# Patient Record
(demographics unavailable — no encounter records)

---

## 2024-11-01 NOTE — HISTORY OF PRESENT ILLNESS
[FreeTextEntry1] : Heather is here for a f/u visit. Moved to [place] over the summer.  For several months had some increased burning with intercourse but not as severe as it previously had been, still tolerable.  She has been using topical AGL 2/5/2% ftp to introitus BID and B20 suppositories PV qhs.  Started to have a flare 2 weeks ago during and after intercourse.  Reports increased constant burning.  She stopped taking montelukast sometime in the last 1-2 months. She had been going to PFPT in [facility] which had been greatly helping mitigate her PFM spasms and deep dyspareunia. She had to stop when she moved, but found another PFPT near her in [place] and is planning on restarting.  Denies symptoms of BV. Has her menses today. States she still wears a pantyliner daily b/c she thinks she has too much vaginal discharge.

## 2024-11-01 NOTE — DISCUSSION/SUMMARY
[FreeTextEntry1] : Lengthy discussion re: chronicity of condition and related flares.   Increase topical cream to AGL 4/6//2% in lipoderm ftp to introitus BID.  Increase B20 suppositories PV to BID x 2 months then decrease back to QHS. Restart PFPT in [place]. Restart singulair 10mg po QD.   We discussed the effects of altered pH on the vaginal microbiome and risk for vaginal infections. I suggested that she try GCL Flourish suppositories which contain L. crispatus and use 1 suppository PV 3x/week along with GCL Balance wash. I explained that the Flourish suppositories and Balance wash are not FDA approved, however, she can go on their website and read literature from WHO and NIH regarding it's use in helping to maintain healthy vaginal ecosystem.  t/c dilation therapy  Referred to Yeimi Renteria, PhD in [place] for f/u care as [place] is [place] far for her to travel from [place].      Fluconazole Counseling:  Patient counseled regarding adverse effects of fluconazole including but not limited to headache, diarrhea, nausea, upset stomach, liver function test abnormalities, taste disturbance, and stomach pain.  There is a rare possibility of liver failure that can occur when taking fluconazole.  The patient understands that monitoring of LFTs and kidney function test may be required, especially at baseline. The patient verbalized understanding of the proper use and possible adverse effects of fluconazole.  All of the patient's questions and concerns were addressed.

## 2024-11-01 NOTE — PHYSICAL EXAM
[No Acute Distress] : in no acute distress [Well developed] : well developed [Well Nourished] : ~L well nourished [Good Hygeine] : demonstrates good hygeine [Oriented x3] : oriented to person, place, and time [Normal Memory] : ~T memory was ~L unimpaired [Normal Mood/Affect] : mood and affect are normal [Respirations regular] : ~T respiratory rate was regular [No Edema] : ~T edema was not present [Warm and Dry] : was warm and dry to touch [Normal Gait] : gait was normal [No Lesions] : no lesions were seen on the external genitalia [Labia Majora] : were normal [Labia Minora] : were normal [Normal Appearance] : general appearance was normal [Pink Rugae] : pink rugae [Normal] : no abnormalities [Exam Deferred] : was deferred [Chaperone Present] : A chaperone was present in the examining room during all aspects of the physical examination [80208] : A chaperone was present during the pelvic exam. [Heavy] : there was heavy vaginal bleeding [FreeTextEntry2] : Annabel Goldstein MD [Tenderness] : ~T no ~M abdominal tenderness observed [Distended] : not distended [de-identified] : Q-tip touch test 5/10 @ 1,5,6. No erythema, fissures, ulcerations, erosions [FreeTextEntry3] : Non-tender  [FreeTextEntry4] : PFMs 3/4 with MTrPs b/l PC/IC/C with +taut bands and moderate pain to palpation; +menses